# Patient Record
Sex: FEMALE | Race: WHITE | NOT HISPANIC OR LATINO | Employment: FULL TIME | URBAN - METROPOLITAN AREA
[De-identification: names, ages, dates, MRNs, and addresses within clinical notes are randomized per-mention and may not be internally consistent; named-entity substitution may affect disease eponyms.]

---

## 2019-04-03 ENCOUNTER — OFFICE VISIT (OUTPATIENT)
Dept: ENDOCRINOLOGY | Facility: HOSPITAL | Age: 54
End: 2019-04-03
Payer: COMMERCIAL

## 2019-04-03 ENCOUNTER — TELEPHONE (OUTPATIENT)
Dept: ENDOCRINOLOGY | Facility: HOSPITAL | Age: 54
End: 2019-04-03

## 2019-04-03 VITALS
HEIGHT: 64 IN | WEIGHT: 137.2 LBS | HEART RATE: 84 BPM | BODY MASS INDEX: 23.42 KG/M2 | SYSTOLIC BLOOD PRESSURE: 130 MMHG | DIASTOLIC BLOOD PRESSURE: 82 MMHG

## 2019-04-03 DIAGNOSIS — E04.1 LEFT THYROID NODULE: ICD-10-CM

## 2019-04-03 DIAGNOSIS — E04.9 GOITER: Primary | ICD-10-CM

## 2019-04-03 PROCEDURE — 99204 OFFICE O/P NEW MOD 45 MIN: CPT | Performed by: INTERNAL MEDICINE

## 2019-04-03 RX ORDER — CHLORAL HYDRATE 500 MG
1000 CAPSULE ORAL DAILY
COMMUNITY

## 2019-11-11 ENCOUNTER — TELEPHONE (OUTPATIENT)
Dept: ENDOCRINOLOGY | Facility: HOSPITAL | Age: 54
End: 2019-11-11

## 2019-11-11 NOTE — TELEPHONE ENCOUNTER
New pre-cert was needed and done through [id number]  It was approved from 11/10/19 till 12/26/19  Auth number is [id number]

## 2019-11-11 NOTE — TELEPHONE ENCOUNTER
Patient has an order for a CT scan of the thyroid from April  She did get this approved through [facility] but is no longer valid  Are you able to do another pre cert on [employer] for this? She will be going to [facility] to have this done within the next month

## 2019-11-19 DIAGNOSIS — E04.9 GOITER: Primary | ICD-10-CM

## 2019-11-19 DIAGNOSIS — E04.1 LEFT THYROID NODULE: ICD-10-CM

## 2020-01-29 DIAGNOSIS — E04.9 GOITER: Primary | ICD-10-CM

## 2020-01-29 DIAGNOSIS — E04.1 LEFT THYROID NODULE: ICD-10-CM

## 2020-03-27 LAB
T4 FREE SERPL-MCNC: 1.3 NG/DL (ref 0.8–1.8)
TSH SERPL-ACNC: 1.23 MIU/L

## 2020-04-03 ENCOUNTER — TELEPHONE (OUTPATIENT)
Dept: OTHER | Facility: OTHER | Age: 55
End: 2020-04-03

## 2020-04-13 ENCOUNTER — TELEMEDICINE (OUTPATIENT)
Dept: ENDOCRINOLOGY | Facility: HOSPITAL | Age: 55
End: 2020-04-13
Payer: COMMERCIAL

## 2020-04-13 DIAGNOSIS — E04.1 LEFT THYROID NODULE: ICD-10-CM

## 2020-04-13 DIAGNOSIS — E04.9 GOITER: Primary | ICD-10-CM

## 2020-04-13 PROCEDURE — 99214 OFFICE O/P EST MOD 30 MIN: CPT | Performed by: INTERNAL MEDICINE

## 2020-04-13 RX ORDER — ZINC 25 MG
TABLET ORAL
COMMUNITY

## 2021-04-08 LAB
T4 FREE SERPL-MCNC: 1.2 NG/DL (ref 0.8–1.8)
TSH SERPL-ACNC: 1.06 MIU/L

## 2021-04-14 ENCOUNTER — OFFICE VISIT (OUTPATIENT)
Dept: ENDOCRINOLOGY | Facility: HOSPITAL | Age: 56
End: 2021-04-14
Payer: COMMERCIAL

## 2021-04-14 VITALS
DIASTOLIC BLOOD PRESSURE: 80 MMHG | HEIGHT: 63 IN | BODY MASS INDEX: 24.17 KG/M2 | SYSTOLIC BLOOD PRESSURE: 142 MMHG | WEIGHT: 136.4 LBS | HEART RATE: 84 BPM

## 2021-04-14 DIAGNOSIS — E04.9 GOITER: Primary | ICD-10-CM

## 2021-04-14 DIAGNOSIS — E04.1 LEFT THYROID NODULE: ICD-10-CM

## 2021-04-14 PROCEDURE — 99213 OFFICE O/P EST LOW 20 MIN: CPT | Performed by: INTERNAL MEDICINE

## 2021-04-14 NOTE — PROGRESS NOTES
4/15/2021    Assessment/Plan      Diagnoses and all orders for this visit:    Goiter  -     TSH, 3rd generation Lab Collect; Future  -     T4, free Lab Collect; Future  -     US thyroid; Future    Left thyroid nodule  -     TSH, 3rd generation Lab Collect; Future  -     T4, free Lab Collect; Future  -     US thyroid; Future        Assessment/Plan:    1  Thyroid goiter  Thyroid function tests are normal   She is biochemically and clinically euthyroid at this point  No thyroid hormone replacement needs to be started  She is having no significant compressive thyroid symptoms  2 Left-sided dominant thyroid nodule  This nodule has been biopsied in the past and was benign  Her thyroid ultrasound did show a relatively stable size thyroid nodule with some minor increase in size  She is having no compressive thyroid symptoms so I will continue to follow this thyroid over time with serial ultrasounds  I have asked her to follow up in 1 year with preceding TSH, free T4, and thyroid ultrasound  CC: Thyroid nodule and goiter follow-up    History of Present Illness     HPI: Thor Sykes is a 54y o  year old female with history of   Multinodular goiter with very large dominant left-sided thyroid nodule for follow-up visit  She had some unusual symptoms in her neck in September 2017 and was found to have an enlarged thyroid with some tracheal deviation on chest x-ray  Fine-needle aspiration biopsy of the left-sided thyroid nodule was benign  There is reports she may have had a biopsy of a thyroid nodule over 20 years ago  She did have a CT scan of her neck in November 2019 demonstrating mild tracheal deviation to the right but no tracheal compression  She has been followed with serial blood work and ultrasounds  She is currently on no thyroid medication  Review of Systems   Constitutional: Positive for fatigue  Negative for unexpected weight change  HENT: Negative for trouble swallowing  When lay down  Feels something in throat but no throat closing  Will have choking sensation when hyperextends neck  Eyes: Negative for visual disturbance  No diplopia  Respiratory: Negative for chest tightness and shortness of breath  Cardiovascular: Positive for palpitations  Negative for chest pain  Occasional heart palpitations  It can last up to 30 min  Gastrointestinal: Negative for abdominal pain, constipation, diarrhea and nausea  Had colonoscopy at [address] [facility] [facility] in [place] in 2019  Endocrine: Negative for cold intolerance and heat intolerance  Feels body has a hard time with regulating temperatures when temperature changes  Genitourinary:        Menses still occur every 5-6 months  Skin: Negative for rash  No dry skin  Has brittle nails  No hair loss  Neurological: Negative for dizziness, tremors, light-headedness and headaches  Will get dizzy and headaches when very fatigued  Skin stings and prickly and itchy  Psychiatric/Behavioral: Positive for sleep disturbance  Negative for dysphoric mood  The patient is not nervous/anxious  Wakes overnight for 1-2 hours every night         Historical Information   Past Medical History:   Diagnosis Date    Asthma     diagnosed 2019    Calcific tendonitis of right shoulder     Frozen shoulder 2019    left    History of Lyme disease      Past Surgical History:   Procedure Laterality Date     SECTION      X 4    TUBAL LIGATION Bilateral      Social History   Social History     Substance and Sexual Activity   Alcohol Use Yes    Frequency: Monthly or less     Social History     Substance and Sexual Activity   Drug Use Never     Social History     Tobacco Use   Smoking Status Never Smoker   Smokeless Tobacco Never Used     Family History:   Family History   Problem Relation Age of Onset    Hypertension Mother     Cancer Father         prostate    Heart disease Father     Heart Valve Disease Father         post AVR    No Known Problems Sister     Parkinsonism Brother     No Known Problems Daughter     No Known Problems Son     No Known Problems Son     No Known Problems Son        Meds/Allergies   Current Outpatient Medications   Medication Sig Dispense Refill    ascorbic acid (VITAMIN C) 1000 MG tablet Take 1,000 mg by mouth daily      b complex vitamins tablet Take 1 tablet by mouth daily      Cholecalciferol (VITAMIN D-3) 5000 units TABS Take by mouth daily      Omega-3 Fatty Acids (FISH OIL) 1,000 mg Take 1,000 mg by mouth daily      selenium 50 MCG TABS Take 50 mcg by mouth daily      Zinc 25 MG TABS Take by mouth      ASHWAGANDHA PO Take by mouth daily      milk thistle 175 MG tablet Take 175 mg by mouth daily       No current facility-administered medications for this visit  Allergies   Allergen Reactions    Anesthesia S-I-40 [Propofol] Vomiting       Objective   Vitals: Blood pressure 142/80, pulse 84, height 5' 3" (1 6 m), weight 61 9 kg (136 lb 6 4 oz)  Invasive Devices     None                 Physical Exam  Vitals signs reviewed  Constitutional:       Appearance: Normal appearance  She is well-developed  HENT:      Head: Normocephalic and atraumatic  Eyes:      Extraocular Movements: Extraocular movements intact  Conjunctiva/sclera: Conjunctivae normal       Comments: No lid lag, stare, proptosis, or periorbital edema  Neck:      Musculoskeletal: Normal range of motion and neck supple  Thyroid: No thyromegaly  Vascular: No carotid bruit  Comments: Thyroid globular and enlarged with the left side significantly larger than the right  No bruits over the thyroid gland  No lymphadenopathy  Cardiovascular:      Rate and Rhythm: Normal rate and regular rhythm  Heart sounds: Normal heart sounds  No murmur  Pulmonary:      Effort: Pulmonary effort is normal       Breath sounds: Normal breath sounds   No wheezing  Abdominal:      Palpations: Abdomen is soft  Musculoskeletal: Normal range of motion  General: No deformity  Right lower leg: No edema  Left lower leg: No edema  Comments: No tremor of the outstretched hands  Lymphadenopathy:      Cervical: No cervical adenopathy  Skin:     General: Skin is warm and dry  Findings: No rash  Neurological:      Mental Status: She is alert and oriented to person, place, and time  Deep Tendon Reflexes: Reflexes are normal and symmetric  Comments: Deep tendon reflexes normal          The history was obtained from the review of the chart and from the patient  Lab Results:      Blood work done on 04/07/2021 demonstrates following results:    Lab Results   Component Value Date    TSH 1 06 04/07/2021    FREET4 1 2 04/07/2021       Thyroid ultrasound:    Thyroid ultrasound performed at [facility] on 04/13/2020 showed a right lobe of the thyroid measuring 6 3 x 1 8 x 1 4 cm and a left lobe of the thyroid measuring 6 x 2 9 x 4 cm  There are 2 subcentimeter thyroid nodules in the right lobe of the thyroid not significantly changed in size  The left thyroid nodule that is large is now 5 cm in size which is slightly larger  There is a 2nd 1 7 cm thyroid nodule in the left lobe of the thyroid unchanged in size          Future Appointments   Date Time Provider Sulma Fleming   4/20/2022  3:20 PM Sudhir Morales MD [facility]

## 2021-04-14 NOTE — PATIENT INSTRUCTIONS
The thyroid blood work is normal   The ultrasound shows stable nodules  Follow up in 1 year with blood work and thyroid ultrasound

## 2022-04-19 ENCOUNTER — TELEPHONE (OUTPATIENT)
Dept: ENDOCRINOLOGY | Facility: HOSPITAL | Age: 57
End: 2022-04-19

## 2022-04-19 DIAGNOSIS — E04.9 GOITER: ICD-10-CM

## 2022-04-19 DIAGNOSIS — E04.1 LEFT THYROID NODULE: Primary | ICD-10-CM

## 2022-04-19 NOTE — TELEPHONE ENCOUNTER
Patient called and rescheduled her appointment for July  Are you able to reorder the [address] since her current script is ? Thanks!     This can be mailed with new lab orders

## 2022-07-21 LAB
T4 FREE SERPL-MCNC: 1.3 NG/DL (ref 0.8–1.8)
TSH SERPL-ACNC: 1.67 MIU/L (ref 0.4–4.5)

## 2022-07-25 ENCOUNTER — OFFICE VISIT (OUTPATIENT)
Dept: ENDOCRINOLOGY | Facility: HOSPITAL | Age: 57
End: 2022-07-25
Payer: COMMERCIAL

## 2022-07-25 VITALS
SYSTOLIC BLOOD PRESSURE: 118 MMHG | DIASTOLIC BLOOD PRESSURE: 70 MMHG | BODY MASS INDEX: 22.45 KG/M2 | HEIGHT: 64 IN | WEIGHT: 131.5 LBS

## 2022-07-25 DIAGNOSIS — E04.9 GOITER: ICD-10-CM

## 2022-07-25 DIAGNOSIS — E04.1 LEFT THYROID NODULE: Primary | ICD-10-CM

## 2022-07-25 PROCEDURE — 99213 OFFICE O/P EST LOW 20 MIN: CPT | Performed by: INTERNAL MEDICINE

## 2022-07-25 NOTE — PROGRESS NOTES
7/25/2022    Assessment/Plan      Diagnoses and all orders for this visit:    Left thyroid nodule  -     TSH, 3rd generation Lab Collect; Future  -     T4, free Lab Collect; Future  -     US thyroid; Future    Goiter  -     TSH, 3rd generation Lab Collect; Future  -     T4, free Lab Collect; Future  -     US thyroid; Future        Assessment/Plan:  1  Nontoxic multinodular goiter  She has a very large thyroid with a large 5 cm thyroid nodule in the left lobe  She is clinically and biochemically euthyroid based on blood work  She is having no compressive thyroid symptoms    2  Left-sided dominant thyroid nodule  Recent thyroid ultrasound does demonstrate stable size thyroid and thyroid nodules  She still has a very large left-sided nodule  She is not interested in surgery if she can avoid it so unless she is having compressive thyroid symptoms, I would not perform surgery, I would just continue to observe over time  I have asked her to follow up in 1 year with preceding TSH, free T4, and thyroid ultrasound  CC:  Thyroid nodule and goiter followed    History of Present Illness     HPI: Renetta Smith is a 64y o  year old female with history of multinodular goiter with large dominant left-sided thyroid nodule for follow-up visit  She had some unusual symptoms in her neck in September 2017 and was found to have an enlarged thyroid with some tracheal deviation on chest x-ray  Fine-needle aspiration biopsy demonstrated a benign pathology  She did have a thyroid nodule biopsy over 20 years prior  A CT of her neck in November 2019 demonstrates mild tracheal deviation to the right but no tracheal compression  She is followed with serial blood work and ultrasound  She is currently on no thyroid medication  Weight is 5 lb less than last year  She is fatigued and can take 45 minutes to fall asleep and then wake up at 3:00 a m  for several hours    She denies heat or cold intolerance but just tends to be on the warmer side in general   She has palpitations at times over the last year  She denies tremors  She has dry skin, brittle nails, and hair loss  She does note some depression due to her decrease in energy and fatigue  She denies anxiety, diarrhea or constipation  She denies diplopia  She denies compressive thyroid symptoms or difficulties with swallowing but does sometimes feel as if there is something in her throat her is if she might gag at times  She is never had food gets stuck in her throat and she is never had any problems with breathing when lying flat on her back  Review of Systems   Constitutional: Positive for fatigue  Negative for unexpected weight change  Weight 5 lb less than last year   HENT: Positive for trouble swallowing  Sometimes feeling as if she can almost gag recently  No food getting stuck  Feels like something in her throat  No breathing issues with laying down on back  Eyes: Positive for visual disturbance  No diplopia  Has blurry vision for reading  Needs glasses  Respiratory: Negative for chest tightness and shortness of breath  Cardiovascular: Positive for palpitations  Negative for chest pain  Palpations at times over the last year  Gastrointestinal: Negative for abdominal pain, constipation, diarrhea and nausea  Endocrine: Negative for cold intolerance and heat intolerance  Tends to be on the warmer side  This has been for several years  Skin: Negative for rash  Has dry skin  Has brittle nails  Has hair loss  Neurological: Positive for headaches  Negative for dizziness, tremors and light-headedness  Occasional migraine headaches  Psychiatric/Behavioral: Positive for dysphoric mood and sleep disturbance  The patient is not nervous/anxious  Takes 45 minutes to fall asleep, can wake at 3 am and take up to 2 hours to get back to sleep   A little on the depressed side as fatigued and no energy  Historical Information   Past Medical History:   Diagnosis Date    Asthma     diagnosed 2019    Calcific tendonitis of right shoulder     Frozen shoulder 2019    left    History of Lyme disease      Past Surgical History:   Procedure Laterality Date     SECTION      X 4    TUBAL LIGATION Bilateral      Social History   Social History     Substance and Sexual Activity   Alcohol Use Yes     Social History     Substance and Sexual Activity   Drug Use Never     Social History     Tobacco Use   Smoking Status Never Smoker   Smokeless Tobacco Never Used     Family History:   Family History   Problem Relation Age of Onset    Hypertension Mother     Cancer Father         prostate    Heart disease Father     Heart Valve Disease Father         post AVR    No Known Problems Sister     Parkinsonism Brother     No Known Problems Daughter     No Known Problems Son     No Known Problems Son     No Known Problems Son        Meds/Allergies   Current Outpatient Medications   Medication Sig Dispense Refill    ascorbic acid (VITAMIN C) 1000 MG tablet Take 1,000 mg by mouth daily (Patient not taking: Reported on 2022)      ASHWAGANDHA PO Take by mouth daily (Patient not taking: Reported on 2022)      b complex vitamins tablet Take 1 tablet by mouth daily (Patient not taking: Reported on 2022)      Cholecalciferol (VITAMIN D-3) 5000 units TABS Take by mouth daily (Patient not taking: Reported on 2022)      milk thistle 175 MG tablet Take 175 mg by mouth daily (Patient not taking: Reported on 2022)      Omega-3 Fatty Acids (FISH OIL) 1,000 mg Take 1,000 mg by mouth daily (Patient not taking: Reported on 2022)      selenium 50 MCG TABS Take 50 mcg by mouth daily (Patient not taking: Reported on 2022)      Zinc 25 MG TABS Take by mouth (Patient not taking: Reported on 2022)       No current facility-administered medications for this visit  Allergies   Allergen Reactions    Anesthesia S-I-40 [Propofol] Vomiting       Objective   Vitals: Blood pressure 118/70, height 5' 3 5" (1 613 m), weight 59 6 kg (131 lb 8 oz)  Invasive Devices  Report    None                 Physical Exam  Vitals reviewed  Constitutional:       Appearance: Normal appearance  She is well-developed  HENT:      Head: Normocephalic and atraumatic  Eyes:      Extraocular Movements: Extraocular movements intact  Conjunctiva/sclera: Conjunctivae normal       Comments: No lid lag, stare, proptosis, or periorbital edema  Neck:      Thyroid: No thyromegaly  Vascular: No carotid bruit  Comments: Large globular thyroid left greater than right  No lymphadenopathy  Cardiovascular:      Rate and Rhythm: Normal rate and regular rhythm  Heart sounds: Normal heart sounds  No murmur heard  Pulmonary:      Effort: Pulmonary effort is normal       Breath sounds: Normal breath sounds  No wheezing  Abdominal:      Palpations: Abdomen is soft  Musculoskeletal:         General: No deformity  Normal range of motion  Cervical back: Normal range of motion and neck supple  Right lower leg: No edema  Left lower leg: No edema  Comments: No tremor of the outstretched hands   Lymphadenopathy:      Cervical: No cervical adenopathy  Skin:     General: Skin is warm and dry  Findings: No rash  Neurological:      Mental Status: She is alert and oriented to person, place, and time  Deep Tendon Reflexes: Reflexes are normal and symmetric  Comments: Patellar deep tendon reflexes normal         The history was obtained from the review of the chart and from the patient      Lab Results:      Blood work done on 07/21/2022 demonstrates the following results:    Lab Results   Component Value Date    TSH 1 67 07/21/2022    FREET4 1 3 07/21/2022     Thyroid ultrasound:     Thyroid ultrasound performed at [facility] on 06/08/2022 shows a right lobe of the thyroid measuring 5 7 x 1 4 x 1 9 cm and the left lobe of the thyroid measuring 6 1 x 2 2 x 3 7 cm  There is a 5 1 cm thyroid nodule in the left lobe of the thyroid and a 1 7 cm nodule in the upper left lobe of the thyroid along with a 5 mm thyroid nodule  None of these nodules are significantly changed in size from previous ultrasound      Future Appointments   Date Time Provider Sulma Fleming   7/26/2023 10:20 AM Flor Acosta MD [facility]

## 2022-07-25 NOTE — PATIENT INSTRUCTIONS
The thyroid blood work is normal      The thyroid ultrasound shows the same general size of the thyroid and the nodules  Follow up in 1 year with blood work and thyroid ultrasound

## 2023-07-25 LAB
T4 FREE SERPL-MCNC: 1.1 NG/DL (ref 0.8–1.8)
TSH SERPL-ACNC: 0.95 MIU/L (ref 0.4–4.5)

## 2023-07-26 ENCOUNTER — OFFICE VISIT (OUTPATIENT)
Dept: ENDOCRINOLOGY | Facility: HOSPITAL | Age: 58
End: 2023-07-26
Payer: COMMERCIAL

## 2023-07-26 VITALS
HEART RATE: 74 BPM | SYSTOLIC BLOOD PRESSURE: 124 MMHG | BODY MASS INDEX: 22.74 KG/M2 | DIASTOLIC BLOOD PRESSURE: 80 MMHG | WEIGHT: 133.2 LBS | HEIGHT: 64 IN

## 2023-07-26 DIAGNOSIS — E04.9 GOITER: ICD-10-CM

## 2023-07-26 DIAGNOSIS — E04.1 LEFT THYROID NODULE: Primary | ICD-10-CM

## 2023-07-26 PROCEDURE — 99213 OFFICE O/P EST LOW 20 MIN: CPT | Performed by: INTERNAL MEDICINE

## 2023-07-26 NOTE — PROGRESS NOTES
7/26/2023    Assessment/Plan      Diagnoses and all orders for this visit:    Left thyroid nodule  -     TSH, 3rd generation Lab Collect; Future  -     T4, free Lab Collect; Future  -     US thyroid; Future  -     US guided thyroid biopsy with molecular testing; Future    Goiter  -     TSH, 3rd generation Lab Collect; Future  -     T4, free Lab Collect; Future  -     US thyroid; Future  -     US guided thyroid biopsy with molecular testing; Future    Other orders  -     Iodine, Kelp, (KELP PO); Take by mouth Sea kelp daily        Assessment/Plan:  1. Multinodular goiter. Most recent thyroid function studies are normal.  She is biochemically euthyroid. She does not need any thyroid hormone replacement. She has no compressive thyroid symptoms. 2.  Left-sided dominant thyroid nodule. Her left-sided thyroid nodule continues to enlarge significantly every year. I will repeat a biopsy this year. I have asked her to get a fine-needle aspiration biopsy under ultrasound guidance of that left-sided thyroid nodule with Afirma testing to be drawn and sent if the pathology reads Burt class III or IV, follicular lesion of unclear significance, or cellular atypia. We also discussed the possibility of surgery if she has any compressive thyroid symptoms which she does not have. The other reason for surgery would be continued biopsies on a regular basis with increasing size of the thyroid nodule or pathology that is abnormal.    She will get a fine-needle aspiration biopsy of that left-sided thyroid nodule under ultrasound guidance. I have also asked her to follow-up in 1 year with preceding TSH, free T4, and thyroid ultrasound. CC: Thyroid nodule/goiter follow-up    History of Present Illness     HPI: Maurice Kemp is a 62y.o. year old female with history of multinodular goiter with dominant left-sided thyroid nodule for follow-up visit.     She had some unusual symptoms in her neck in September 2017 and was found to have an enlarged thyroid with some tracheal deviation on chest x-ray. Fine-needle aspiration biopsy demonstrated a benign pathology. She did have a thyroid nodule biopsy over 20 years prior. A CT of her neck in November 2019 demonstrates mild tracheal deviation to the right but no tracheal compression. She is followed with serial blood work and ultrasound. She is currently on no thyroid medication. Last thyroid ultrasound in 2022 showed stable size of nodules. She denies heat or cold intolerance, tremors, or weight changes. She does have some fatigue. She will have occasional heart palpitations that can last anywhere from 10 to 30 minutes. She has insomnia due to some arm issues and numbness and tingling. She denies diarrhea or constipation. She has some dry skin along with brittle nails and thinning hair. She denies diplopia. She denies compressive thyroid symptoms or difficulties with swallowing. Review of Systems   Constitutional: Positive for fatigue. Negative for unexpected weight change. HENT: Negative for trouble swallowing. Eyes: Positive for visual disturbance. Some blurry vision despite the reading glasses. No diplopia. Respiratory: Negative for chest tightness and shortness of breath. Cardiovascular: Positive for palpitations. Negative for chest pain. Occasional heart palpitations. Last only about 10 minutes recently, has been for 30 min in the past.    Gastrointestinal: Negative for abdominal pain, constipation, diarrhea and nausea. Endocrine: Negative for cold intolerance and heat intolerance. Genitourinary:        Postmenopausal.    Musculoskeletal: Negative for neck pain. History in the past of a neck injury. Skin: Negative for rash. Some dry skin. Has brittle nails. Has thinning hair with some hair loss. Neurological: Positive for numbness and headaches. Negative for dizziness, tremors and light-headedness.         Arms and hands with numbness and tingling at night , started on the right. Gets frequent headaches. Headaches worse with many nights kandice  Row of insomnia. Psychiatric/Behavioral: Positive for sleep disturbance. Flopping back and forth due to arm and hand numbness and tingling. Has insomnia.         Historical Information   Past Medical History:   Diagnosis Date   • Asthma     diagnosed 2019   • Calcific tendonitis of right shoulder    • Frozen shoulder 2019    left   • History of Lyme disease      Past Surgical History:   Procedure Laterality Date   •  SECTION      X 4   • TUBAL LIGATION Bilateral      Social History   Social History     Substance and Sexual Activity   Alcohol Use Yes     Social History     Substance and Sexual Activity   Drug Use Never     Social History     Tobacco Use   Smoking Status Never   Smokeless Tobacco Never     Family History:   Family History   Problem Relation Age of Onset   • Hypertension Mother    • Cancer Father         prostate   • Heart disease Father    • Heart Valve Disease Father         post AVR   • No Known Problems Sister    • Parkinsonism Brother    • No Known Problems Daughter    • No Known Problems Son    • No Known Problems Son    • No Known Problems Son        Meds/Allergies   Current Outpatient Medications   Medication Sig Dispense Refill   • ascorbic acid (VITAMIN C) 1000 MG tablet Take 1,000 mg by mouth daily     • ASHWAGANDHA PO Take by mouth daily     • b complex vitamins tablet Take 1 tablet by mouth daily     • Cholecalciferol (VITAMIN D-3) 5000 units TABS Take by mouth daily     • Iodine, Kelp, (KELP PO) Take by mouth Sea kelp daily     • Zinc 25 MG TABS Take by mouth     • milk thistle 175 MG tablet Take 175 mg by mouth daily (Patient not taking: Reported on 2022)     • Omega-3 Fatty Acids (FISH OIL) 1,000 mg Take 1,000 mg by mouth daily (Patient not taking: Reported on 2022)     • selenium 50 MCG TABS Take 50 mcg by mouth daily (Patient not taking: Reported on 7/25/2022)       No current facility-administered medications for this visit. Allergies   Allergen Reactions   • Anesthesia S-I-40 [Propofol] Vomiting       Objective   Vitals: Blood pressure 124/80, pulse 74, height 5' 3.5" (1.613 m), weight 60.4 kg (133 lb 3.2 oz). Invasive Devices     None                 Physical Exam  Vitals reviewed. Constitutional:       Appearance: Normal appearance. She is well-developed. HENT:      Head: Normocephalic and atraumatic. Eyes:      Extraocular Movements: Extraocular movements intact. Conjunctiva/sclera: Conjunctivae normal.      Comments: No lid lag, stare, proptosis, or periorbital edema. Neck:      Thyroid: No thyromegaly. Vascular: No carotid bruit. Comments: Thyroid very large on the left. Left side of the thyroid measuring about 6 cm. No bruits over the thyroid gland. Cardiovascular:      Rate and Rhythm: Normal rate and regular rhythm. Heart sounds: Normal heart sounds. No murmur heard. Pulmonary:      Effort: Pulmonary effort is normal.      Breath sounds: Normal breath sounds. No wheezing. Abdominal:      Palpations: Abdomen is soft. Musculoskeletal:         General: No deformity. Normal range of motion. Cervical back: Normal range of motion and neck supple. Right lower leg: No edema. Left lower leg: No edema. Comments: No tremor of the outstretched hands. Lymphadenopathy:      Cervical: No cervical adenopathy. Skin:     General: Skin is warm and dry. Findings: No rash. Neurological:      Mental Status: She is alert and oriented to person, place, and time. Deep Tendon Reflexes: Reflexes are normal and symmetric. Comments: Patellar deep tendon reflexes normal.         The history was obtained from the review of the chart and from the patient.     Lab Results:   Blood work performed on 7/24/2023 demonstrates the following results:      Lab Results   Component Value Date    TSH 0.95 07/24/2023    FREET4 1.1 07/24/2023       Thyroid ultrasound:    Thyroid ultrasound performed at [facility] on 7/12/2023 showed a right lobe of the thyroid measuring 5.7 x 2.3 x 1.4 cm and the left lobe of the thyroid measuring 5.8 x 3.1 x 4.5 cm. There is a 7 mm right upper lobe thyroid nodule unchanged in size. Is a 1.9 cm left upper thyroid nodule T RADS 4 unchanged in size. Is a large left-sided thyroid nodule 5.4 cm in size that has increased from last year was 5.1 cm and from thousand 18 when it was 4.3 cm. It is a T RADS 3 nodule.       Future Appointments   Date Time Provider [facility] [address]   7/30/2024  4:00 PM Cristofer Morrison MD [facility]

## 2023-07-26 NOTE — PATIENT INSTRUCTIONS
The thyroid blood work is normal.    Given the nodule on the left keeps increasing in size, I think we should rebiopsy it. If the pathology is questionable we will get genetic testing. Follow up in 1 year with blood work and thyroid ultrasound.

## 2025-08-06 ENCOUNTER — HOSPITAL ENCOUNTER (OUTPATIENT)
Age: 60
Discharge: HOME | End: 2025-08-06
Attending: FAMILY MEDICINE
Payer: COMMERCIAL

## 2025-08-06 DIAGNOSIS — E04.1 THYROID NODULE: ICD-10-CM

## 2025-08-06 PROCEDURE — 76536 US EXAM OF HEAD AND NECK: CPT

## 2025-08-14 ENCOUNTER — TELEPHONE (OUTPATIENT)
Age: 60
End: 2025-08-14